# Patient Record
Sex: MALE | URBAN - NONMETROPOLITAN AREA
[De-identification: names, ages, dates, MRNs, and addresses within clinical notes are randomized per-mention and may not be internally consistent; named-entity substitution may affect disease eponyms.]

---

## 2017-10-30 ENCOUNTER — APPOINTMENT (OUTPATIENT)
Age: 82
Setting detail: DERMATOLOGY
End: 2017-10-31

## 2017-10-30 DIAGNOSIS — L259 CONTACT DERMATITIS AND OTHER ECZEMA, UNSPECIFIED CAUSE: ICD-10-CM

## 2017-10-30 PROBLEM — L30.9 DERMATITIS, UNSPECIFIED: Status: ACTIVE | Noted: 2017-10-30

## 2017-10-30 PROCEDURE — OTHER PRESCRIPTION: OTHER

## 2017-10-30 PROCEDURE — OTHER COUNSELING: OTHER

## 2017-10-30 PROCEDURE — 99213 OFFICE O/P EST LOW 20 MIN: CPT

## 2017-10-30 PROCEDURE — OTHER TREATMENT REGIMEN: OTHER

## 2017-10-30 RX ORDER — PREDNISONE 20 MG/1
TABLET ORAL
Qty: 24 | Refills: 0 | Status: ERX | COMMUNITY
Start: 2017-10-30

## 2017-10-30 ASSESSMENT — LOCATION ZONE DERM
LOCATION ZONE: EYELID
LOCATION ZONE: FACE
LOCATION ZONE: NECK

## 2017-10-30 ASSESSMENT — LOCATION DETAILED DESCRIPTION DERM
LOCATION DETAILED: LEFT CENTRAL MALAR CHEEK
LOCATION DETAILED: LEFT LATERAL SUPERIOR EYELID
LOCATION DETAILED: RIGHT LATERAL SUPERIOR EYELID
LOCATION DETAILED: RIGHT CENTRAL MALAR CHEEK
LOCATION DETAILED: RIGHT SUPERIOR ANTERIOR NECK

## 2017-10-30 ASSESSMENT — LOCATION SIMPLE DESCRIPTION DERM
LOCATION SIMPLE: RIGHT SUPERIOR EYELID
LOCATION SIMPLE: LEFT SUPERIOR EYELID
LOCATION SIMPLE: LEFT CHEEK
LOCATION SIMPLE: RIGHT ANTERIOR NECK
LOCATION SIMPLE: RIGHT CHEEK

## 2017-10-30 NOTE — HPI: RASH
How Severe Is Your Rash?: moderate
Is This A New Presentation, Or A Follow-Up?: Rash
Additional History: He occasionally uses Gold bond lotion to shave with and a paper product to get the grit from his eyes.

## 2017-10-30 NOTE — PROCEDURE: TREATMENT REGIMEN
Initiate Treatment: Prednisone 20mg. 3 pills for 2 days, 2 pills for 5 days, 1 pill for 8 days,\\nCordran ointment twice daily for 3-5 days
Otc Regimen: Crisco shortening or Vaseline if needed for dryness
Detail Level: Zone

## 2018-01-22 ENCOUNTER — APPOINTMENT (OUTPATIENT)
Age: 83
Setting detail: DERMATOLOGY
End: 2018-01-22

## 2018-01-22 DIAGNOSIS — L259 CONTACT DERMATITIS AND OTHER ECZEMA, UNSPECIFIED CAUSE: ICD-10-CM

## 2018-01-22 PROBLEM — L23.9 ALLERGIC CONTACT DERMATITIS, UNSPECIFIED CAUSE: Status: ACTIVE | Noted: 2018-01-22

## 2018-01-22 PROCEDURE — 99213 OFFICE O/P EST LOW 20 MIN: CPT

## 2018-01-22 PROCEDURE — OTHER TREATMENT REGIMEN: OTHER

## 2018-01-22 PROCEDURE — OTHER COUNSELING: OTHER

## 2018-01-22 PROCEDURE — OTHER MIPS QUALITY: OTHER

## 2018-01-22 PROCEDURE — OTHER PRESCRIPTION: OTHER

## 2018-01-22 RX ORDER — PREDNISONE 20 MG/1
TABLET ORAL
Qty: 24 | Refills: 0 | Status: ERX

## 2018-01-22 ASSESSMENT — LOCATION SIMPLE DESCRIPTION DERM
LOCATION SIMPLE: LEFT CHEEK
LOCATION SIMPLE: RIGHT CHEEK
LOCATION SIMPLE: RIGHT ANTERIOR NECK
LOCATION SIMPLE: LEFT ANTERIOR NECK

## 2018-01-22 ASSESSMENT — LOCATION DETAILED DESCRIPTION DERM
LOCATION DETAILED: LEFT INFERIOR LATERAL NECK
LOCATION DETAILED: LEFT CENTRAL MALAR CHEEK
LOCATION DETAILED: RIGHT INFERIOR LATERAL NECK
LOCATION DETAILED: RIGHT CENTRAL MALAR CHEEK

## 2018-01-22 ASSESSMENT — SEVERITY ASSESSMENT: SEVERITY: MODERATE TO SEVERE

## 2018-01-22 ASSESSMENT — LOCATION ZONE DERM
LOCATION ZONE: NECK
LOCATION ZONE: FACE

## 2018-01-22 NOTE — PROCEDURE: MIPS QUALITY
Quality 111:Pneumonia Vaccination Status For Older Adults: Pneumococcal Vaccination Previously Received
Quality 226: Preventive Care And Screening: Tobacco Use: Screening And Cessation Intervention: Patient screened for tobacco and is an ex-smoker
Quality 110: Preventive Care And Screening: Influenza Immunization: Influenza Immunization Administered during Influenza season
Quality 130: Documentation Of Current Medications In The Medical Record: Current Medications Documented
Detail Level: Detailed
Quality 431: Preventive Care And Screening: Unhealthy Alcohol Use - Screening: Patient screened for unhealthy alcohol use using a single question and scores less than 2 times per year

## 2018-01-22 NOTE — PROCEDURE: TREATMENT REGIMEN
Plan: Patient understands that he should not be on prednisone for prolonged periods. If the rash reoccurs again, patch testing is advised.
Otc Regimen: Use fragrance free products and can use lotions like Vaseline, Vanicream or Crisco shortening
Samples Given: Cordran 0.05% ointment x2; Vanicream cleansing bar x8 and moisturizing skin cream x4; Vaniply ointment x4
Initiate Treatment: prednisone 20mg- Take 3 pills for 2 days, 2 pills for 5 days, and 1 pill for 8 days. Take dose all at once in AM with food. Take while upright and\\nCordran 0.05% ointment- Apply twice daily to affected areas for up to 5 days only
Detail Level: Zone

## 2018-03-29 ENCOUNTER — APPOINTMENT (OUTPATIENT)
Age: 83
Setting detail: DERMATOLOGY
End: 2018-03-30

## 2018-03-29 DIAGNOSIS — L259 CONTACT DERMATITIS AND OTHER ECZEMA, UNSPECIFIED CAUSE: ICD-10-CM

## 2018-03-29 PROBLEM — L23.9 ALLERGIC CONTACT DERMATITIS, UNSPECIFIED CAUSE: Status: ACTIVE | Noted: 2018-03-29

## 2018-03-29 PROCEDURE — OTHER TREATMENT REGIMEN: OTHER

## 2018-03-29 PROCEDURE — OTHER COUNSELING: OTHER

## 2018-03-29 PROCEDURE — 99213 OFFICE O/P EST LOW 20 MIN: CPT

## 2018-03-29 PROCEDURE — OTHER PRESCRIPTION: OTHER

## 2018-03-29 RX ORDER — PREDNISONE 20 MG/1
TABLET ORAL
Qty: 27 | Refills: 0 | Status: ERX

## 2018-03-29 RX ORDER — MOMETASONE FUROATE 1 MG/G
OINTMENT TOPICAL BID PRN
Qty: 2 | Refills: 1 | Status: ERX | COMMUNITY
Start: 2018-03-29

## 2018-03-29 ASSESSMENT — LOCATION DETAILED DESCRIPTION DERM
LOCATION DETAILED: LEFT DISTAL DORSAL FOREARM
LOCATION DETAILED: RIGHT DISTAL DORSAL FOREARM
LOCATION DETAILED: RIGHT ANTERIOR DISTAL UPPER ARM
LOCATION DETAILED: RIGHT SUPERIOR CENTRAL MALAR CHEEK
LOCATION DETAILED: RIGHT INFERIOR LATERAL NECK
LOCATION DETAILED: LEFT CLAVICULAR NECK
LOCATION DETAILED: LEFT CENTRAL MALAR CHEEK
LOCATION DETAILED: LEFT ANTERIOR DISTAL UPPER ARM

## 2018-03-29 ASSESSMENT — LOCATION SIMPLE DESCRIPTION DERM
LOCATION SIMPLE: RIGHT UPPER ARM
LOCATION SIMPLE: RIGHT CHEEK
LOCATION SIMPLE: RIGHT ANTERIOR NECK
LOCATION SIMPLE: RIGHT FOREARM
LOCATION SIMPLE: LEFT CHEEK
LOCATION SIMPLE: LEFT ANTERIOR NECK
LOCATION SIMPLE: LEFT FOREARM
LOCATION SIMPLE: LEFT UPPER ARM

## 2018-03-29 ASSESSMENT — LOCATION ZONE DERM
LOCATION ZONE: FACE
LOCATION ZONE: ARM
LOCATION ZONE: NECK

## 2018-03-29 NOTE — PROCEDURE: TREATMENT REGIMEN
Plan: schedule patch testing
Initiate Treatment: prednisone 20 mg 3(3), 2(5), 1(8)- take all at once in morning with food\\nmometasone ointment to body plaques\\nTopicort ointment 0.05% twice daily to face
Otc Regimen: Crisco shortening or Vaseline for dryness
Detail Level: Zone
Samples Given: Topicort 0.05% ointment x2

## 2018-03-29 NOTE — PROCEDURE: COUNSELING
Patient Specific Counseling (Will Not Stick From Patient To Patient): Discussed that the patient should leave his feather comforter off of the bed  until finishes course of prednisone\\nAdvised the patient to discontinue all aerosol/fragrance sprays and candles.\\nPlan for patch testing after prednisone taper
Detail Level: Simple

## 2018-04-24 ENCOUNTER — APPOINTMENT (OUTPATIENT)
Age: 83
Setting detail: DERMATOLOGY
End: 2018-04-24

## 2018-04-24 DIAGNOSIS — L259 CONTACT DERMATITIS AND OTHER ECZEMA, UNSPECIFIED CAUSE: ICD-10-CM

## 2018-04-24 PROBLEM — L23.9 ALLERGIC CONTACT DERMATITIS, UNSPECIFIED CAUSE: Status: ACTIVE | Noted: 2018-04-24

## 2018-04-24 PROCEDURE — OTHER PATCH TESTING: OTHER

## 2018-04-24 PROCEDURE — 95044 PATCH/APPLICATION TESTS: CPT

## 2018-04-24 PROCEDURE — OTHER OTHER: OTHER

## 2018-04-24 ASSESSMENT — LOCATION ZONE DERM
LOCATION ZONE: TRUNK
LOCATION ZONE: ARM

## 2018-04-24 ASSESSMENT — LOCATION DETAILED DESCRIPTION DERM
LOCATION DETAILED: LEFT SUPERIOR LATERAL UPPER BACK
LOCATION DETAILED: RIGHT POSTERIOR SHOULDER

## 2018-04-24 ASSESSMENT — LOCATION SIMPLE DESCRIPTION DERM
LOCATION SIMPLE: LEFT UPPER BACK
LOCATION SIMPLE: RIGHT SHOULDER

## 2018-04-24 NOTE — PROCEDURE: PATCH TESTING
Consent: Verbal consent obtained.
Number Of Patches (Maximum Allowable Per Dos By Cms Is 90): 47
Post-Care Instructions: I reviewed with the patient in detail post-care instructions. Patient should keep patches dry and in place until returns in 48 hours.
Detail Level: None

## 2018-04-26 ENCOUNTER — APPOINTMENT (OUTPATIENT)
Age: 83
Setting detail: DERMATOLOGY
End: 2018-04-26

## 2018-04-26 DIAGNOSIS — L82.1 OTHER SEBORRHEIC KERATOSIS: ICD-10-CM

## 2018-04-26 DIAGNOSIS — L90.5 SCAR CONDITIONS AND FIBROSIS OF SKIN: ICD-10-CM

## 2018-04-26 DIAGNOSIS — L259 CONTACT DERMATITIS AND OTHER ECZEMA, UNSPECIFIED CAUSE: ICD-10-CM

## 2018-04-26 PROBLEM — F32.9 MAJOR DEPRESSIVE DISORDER, SINGLE EPISODE, UNSPECIFIED: Status: ACTIVE | Noted: 2018-04-26

## 2018-04-26 PROBLEM — L23.9 ALLERGIC CONTACT DERMATITIS, UNSPECIFIED CAUSE: Status: ACTIVE | Noted: 2018-04-26

## 2018-04-26 PROCEDURE — OTHER PATIENT SPECIFIC COUNSELING: OTHER

## 2018-04-26 PROCEDURE — 99213 OFFICE O/P EST LOW 20 MIN: CPT

## 2018-04-26 PROCEDURE — OTHER REASSURANCE: OTHER

## 2018-04-26 PROCEDURE — OTHER EXTENDED SERIES READING: OTHER

## 2018-04-26 PROCEDURE — OTHER OTHER: OTHER

## 2018-04-26 ASSESSMENT — LOCATION SIMPLE DESCRIPTION DERM: LOCATION SIMPLE: LEFT CHEEK

## 2018-04-26 ASSESSMENT — LOCATION ZONE DERM: LOCATION ZONE: FACE

## 2018-04-26 ASSESSMENT — LOCATION DETAILED DESCRIPTION DERM: LOCATION DETAILED: LEFT SUPERIOR MEDIAL MALAR CHEEK

## 2018-04-26 NOTE — PROCEDURE: OTHER
Detail Level: Detailed
Other (Free Text): The patches were removed,  his back was re-marked and photographed.\\nThe patient was given his personal products back although I asked that he keep them in the bag and bring them back with him on 4/30/18 in case any others show up positive so we can read the ingredient list.
Note Text (......Xxx Chief Complaint.): This diagnosis correlates with the

## 2018-04-26 NOTE — HPI: SECONDARY COMPLAINT
How Severe Is This Condition?: mild
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 Andrade sent him to see Dr. Manuel for Mohs Treatment.

## 2018-04-26 NOTE — PROCEDURE: PATIENT SPECIFIC COUNSELING
Detail Level: Simple
Discussed positive patches and need to avoid fragrance, including burning candles. \\nWe will give him avoidance sheets at his final visit on 4/30/18.\\nHis wife will re-kassy the patch areas after the patient bathes, but he is to avoid scrubbing his back.
Discussed people with skin cancers should be monitored at least once yearly

## 2018-04-26 NOTE — PROCEDURE: EXTENDED SERIES READING
Allergen 172 Reaction: no reaction
Name Of Allergen 22: anthony (methylchloroisothiazolinone/methylisonthiazolinone)
Name Of Allergen 19: gold sodium thiosulfate 0.5%
Name Of Allergen 1: bacitracin 20%
Name Of Allergen 6: budesonide 0.01%
Name Of Allergen 27: N-isopropyl-N-phenyl-p-phenylenediamine
Name Of Allergen 25: 2-mercaptobenzothiazole
Allergen 9 Reaction: 2+
Name Of Allergen 23: lanolin alcohol 30%
Name Of Allergen 8: carba mix 3%
Allergen 42 Reaction: 3+
Name Of Allergen 24: mercapto mix (C) 2%
Name Of Allergen 34: potassium dichromate 0.5%
Number Of Patches Read: 47
Name Of Allergen 4: black rubber mix - PPD (B) 0.6%
Name Of Allergen 32: paraben mix (A) 16%
Name Of Allergen 3: bisphenol A epoxy resin 1%
Name Of Allergen 21: Imidazolindinyl urea 2%
Name Of Allergen 41: Eucerin Intensive Repair
Name Of Allergen 18: glutaraldehyde 0.3%
Name Of Allergen 37: sesquiterpenelactone mix (2ml) 0.1%
Show Negative Results In The Note?: Yes
Name Of Allergen 11: colophony 20%
Name Of Allergen 17: fragrance mix II 14%
Name Of Allergen 33: parthenolide (2ml) 0.1%
Name Of Allergen 46: cologne (no name on bottle)
Name Of Allergen 16: fragrance mix 8%
Name Of Allergen 36: quaternium 15 1%
What Reading Time Point?: 48 hour
Name Of Allergen 20: hydrocortisone-17-butyrate 1%
Name Of Allergen 2: balsam of peru
Name Of Allergen 9: cinnamic aldehyde 1%
Name Of Allergen 44: Francisco lora
Name Of Allergen 38: thiuram mix (A) 1%
Name Of Allergen 31: p-xntx-ytsxdqvjjvn formaldehyde resin 1%
Name Of Allergen 47: Adhesive from Heat Patch
Name Of Allergen 10: cobalt (II) chloride hexahydrate 1%
Name Of Allergen 14: ethylenediamine dihydrochloride 1%
Name Of Allergen 45: Laly Musk Fire aftershave
Name Of Allergen 43: Scar Creme
Name Of Allergen 15: formaldehyde 1%
Name Of Allergen 30: p-phenylenediamine 1%
Name Of Allergen 42: Gold Bond Ultimate
Name Of Allergen 12: diazolidinyl urea (Germall II) 1%
Name Of Allergen 13: disperse blue mix (124/106) 1%
Name Of Allergen 26: methyldibromo glutaronitrile 0.3%
Name Of Allergen 40: (blank)
Name Of Allergen 39: Tixocortol-21-pivalate 0.1%
Name Of Allergen 5: Bronopol (2-bromo-2nitropropane-1.3-diol) 0.5%
Name Of Allergen 35: propylene glycol 10%
Name Of Allergen 28: neomycin sulfate 20%
Detail Level: Zone
Name Of Allergen 7: beto mix (A) 7%
Name Of Allergen 29: nickel sulfate hexahydrate 5%

## 2018-04-30 ENCOUNTER — APPOINTMENT (OUTPATIENT)
Age: 83
Setting detail: DERMATOLOGY
End: 2018-04-30

## 2018-04-30 DIAGNOSIS — L259 CONTACT DERMATITIS AND OTHER ECZEMA, UNSPECIFIED CAUSE: ICD-10-CM

## 2018-04-30 DIAGNOSIS — L82.1 OTHER SEBORRHEIC KERATOSIS: ICD-10-CM

## 2018-04-30 DIAGNOSIS — L90.5 SCAR CONDITIONS AND FIBROSIS OF SKIN: ICD-10-CM

## 2018-04-30 PROBLEM — Z85.828 PERSONAL HISTORY OF OTHER MALIGNANT NEOPLASM OF SKIN: Status: ACTIVE | Noted: 2018-04-30

## 2018-04-30 PROBLEM — L23.9 ALLERGIC CONTACT DERMATITIS, UNSPECIFIED CAUSE: Status: ACTIVE | Noted: 2018-04-30

## 2018-04-30 PROBLEM — I10 ESSENTIAL (PRIMARY) HYPERTENSION: Status: ACTIVE | Noted: 2018-04-30

## 2018-04-30 PROCEDURE — OTHER REASSURANCE: OTHER

## 2018-04-30 PROCEDURE — OTHER COUNSELING: OTHER

## 2018-04-30 PROCEDURE — OTHER PATIENT SPECIFIC COUNSELING: OTHER

## 2018-04-30 PROCEDURE — OTHER EXTENDED SERIES READING: OTHER

## 2018-04-30 PROCEDURE — 99213 OFFICE O/P EST LOW 20 MIN: CPT

## 2018-04-30 PROCEDURE — OTHER TREATMENT REGIMEN: OTHER

## 2018-04-30 ASSESSMENT — LOCATION ZONE DERM
LOCATION ZONE: ARM
LOCATION ZONE: HAND

## 2018-04-30 ASSESSMENT — LOCATION SIMPLE DESCRIPTION DERM
LOCATION SIMPLE: RIGHT HAND
LOCATION SIMPLE: LEFT FOREARM

## 2018-04-30 ASSESSMENT — LOCATION DETAILED DESCRIPTION DERM
LOCATION DETAILED: LEFT PROXIMAL DORSAL FOREARM
LOCATION DETAILED: RIGHT ULNAR DORSAL HAND

## 2018-04-30 NOTE — PROCEDURE: COUNSELING
Detail Level: Simple
Patient Specific Counseling (Will Not Stick From Patient To Patient): Recommend periodic skin cancer surveillance
Detail Level: Zone

## 2018-04-30 NOTE — PROCEDURE: EXTENDED SERIES READING
Allergen 113 Reaction: no reaction
Name Of Allergen 39: Tixocortol-21-pivalate 0.1%
Name Of Allergen 16: fragrance mix 8%
Name Of Allergen 4: black rubber mix - PPD (B) 0.6%
Name Of Allergen 33: parthenolide (2ml) 0.1%
Name Of Allergen 21: Imidazolindinyl urea 2%
Show Negative Results In The Note?: Yes
Name Of Allergen 38: thiuram mix (A) 1%
Name Of Allergen 36: quaternium 15 1%
Name Of Allergen 41: Eucerin intensive repair
Name Of Allergen 32: paraben mix (A) 16%
Name Of Allergen 27: N-isopropyl-N-phenyl-p-phenylenediamine
Name Of Allergen 28: neomycin sulfate 20%
Name Of Allergen 13: disperse blue mix (124/106) 1%
Name Of Allergen 26: methyldibromo glutaronitrile 0.3%
Detail Level: Zone
Name Of Allergen 2: balsam of peru
Name Of Allergen 42: Gold Bond Ultimate
Name Of Allergen 20: hydrocortisone-17-butyrate 1%
Allergen 30 Reaction: 3+
Name Of Allergen 25: 2-mercaptobenzothiazole
Name Of Allergen 29: nickel sulfate hexahydrate 5%
Name Of Allergen 22: anthony (methylchloroisothiazolinone/methylisonthiazolinone)
Name Of Allergen 8: carba mix 3%
Name Of Allergen 9: cinnamic aldehyde 1%
Name Of Allergen 5: Bronopol (2-bromo-2nitropropane-1.3-diol) 0.5%
Name Of Allergen 40: (blank)
Name Of Allergen 31: y-xcpp-tjefflkoiok formaldehyde resin 1%
Name Of Allergen 11: colophony 20%
Name Of Allergen 6: budesonide 0.01%
Name Of Allergen 23: lanolin alcohol 30%
Name Of Allergen 15: formaldehyde 1%
Name Of Allergen 35: propylene glycol 10%
Name Of Allergen 7: beto mix (A) 7%
Number Of Patches Read: 47
Name Of Allergen 34: potassium dichromate 0.5%
Name Of Allergen 24: mercapto mix (C) 2%
Name Of Allergen 3: bisphenol A epoxy resin 1%
Name Of Allergen 10: cobalt (II) chloride hexahydrate 1%
Name Of Allergen 37: sesquiterpenelactone mix (2ml) 0.1%
Name Of Allergen 19: gold sodium thiosulfate 0.5%
What Reading Time Point?: 144 hour
Name Of Allergen 30: p-phenylenediamine 1%
Name Of Allergen 12: diazolidinyl urea (Germall II) 1%
Name Of Allergen 17: fragrance mix II 14%
Name Of Allergen 1: bacitracin 20%
Name Of Allergen 18: glutaraldehyde 0.3%
Name Of Allergen 14: ethylenediamine dihydrochloride 1%

## 2018-04-30 NOTE — PROCEDURE: TREATMENT REGIMEN
Detail Level: Simple
Plan: Avoid products as discussed above. If his face breaks out again he can use the Cordran ointment or Topicort 0.05% ointment that he had sample of earlier. If his neck breaks out he can use the mometasone

## 2018-04-30 NOTE — PROCEDURE: PATIENT SPECIFIC COUNSELING
Patient needs to avoid fragrances, dying hair and beard, and Paba in sunscreens.\\nAvoidance sheets to Cinnamic aldehyde, fragrance mix and p-phenylenediamine  given to patient\\nDiscussed the patient only needs to be exposed to an allergen once every two weeks to keep an allergy going. After he is calm for an extended time, if they want to try their candles again try one at a time for at least two weeks. Stop and do not use again if he were to break out again.
Detail Level: Simple

## 2018-10-01 ENCOUNTER — APPOINTMENT (OUTPATIENT)
Age: 83
Setting detail: DERMATOLOGY
End: 2018-10-01

## 2018-10-01 DIAGNOSIS — L259 CONTACT DERMATITIS AND OTHER ECZEMA, UNSPECIFIED CAUSE: ICD-10-CM

## 2018-10-01 PROBLEM — L23.9 ALLERGIC CONTACT DERMATITIS, UNSPECIFIED CAUSE: Status: ACTIVE | Noted: 2018-10-01

## 2018-10-01 PROCEDURE — 99213 OFFICE O/P EST LOW 20 MIN: CPT

## 2018-10-01 PROCEDURE — OTHER COUNSELING: OTHER

## 2018-10-01 PROCEDURE — OTHER EDUCATIONAL RESOURCES PROVIDED: OTHER

## 2018-10-01 PROCEDURE — OTHER PRESCRIPTION: OTHER

## 2018-10-01 PROCEDURE — OTHER TREATMENT REGIMEN: OTHER

## 2018-10-01 RX ORDER — PREDNISONE 20 MG/1
TABLET ORAL
Qty: 21 | Refills: 0 | Status: ERX

## 2018-10-01 RX ORDER — MOMETASONE FUROATE 1 MG/G
OINTMENT TOPICAL BID PRN
Qty: 2 | Refills: 1 | Status: ERX

## 2018-10-01 ASSESSMENT — LOCATION ZONE DERM
LOCATION ZONE: FACE
LOCATION ZONE: NECK
LOCATION ZONE: EYELID

## 2018-10-01 ASSESSMENT — LOCATION DETAILED DESCRIPTION DERM
LOCATION DETAILED: LEFT INFERIOR CENTRAL MALAR CHEEK
LOCATION DETAILED: LEFT LATERAL SUPERIOR EYELID
LOCATION DETAILED: RIGHT CENTRAL MALAR CHEEK
LOCATION DETAILED: RIGHT LATERAL SUPERIOR EYELID
LOCATION DETAILED: RIGHT INFERIOR LATERAL NECK
LOCATION DETAILED: LEFT INFERIOR LATERAL NECK

## 2018-10-01 ASSESSMENT — LOCATION SIMPLE DESCRIPTION DERM
LOCATION SIMPLE: RIGHT ANTERIOR NECK
LOCATION SIMPLE: RIGHT SUPERIOR EYELID
LOCATION SIMPLE: LEFT ANTERIOR NECK
LOCATION SIMPLE: LEFT CHEEK
LOCATION SIMPLE: LEFT SUPERIOR EYELID
LOCATION SIMPLE: RIGHT CHEEK

## 2018-10-01 NOTE — PROCEDURE: TREATMENT REGIMEN
Samples Given: Cordran lotion x 1
Initiate Treatment: Prednisone 20mg tablets; take three pills by mouth for two days, take two pills by mouth for five days, then take one pill by mouth for five days.\\nCordran lotion twice daily to face.\\nMometasone 0.1% topical ointment; apply twice daily to affected area of the neck. Do not apply to face, armpits, or groin.\\n
Detail Level: Simple
